# Patient Record
Sex: FEMALE | Race: OTHER | HISPANIC OR LATINO | Employment: FULL TIME | URBAN - METROPOLITAN AREA
[De-identification: names, ages, dates, MRNs, and addresses within clinical notes are randomized per-mention and may not be internally consistent; named-entity substitution may affect disease eponyms.]

---
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
## 2018-07-25 ENCOUNTER — TRANSCRIBE ORDERS (OUTPATIENT)
Dept: LAB | Facility: CLINIC | Age: 18
End: 2018-07-25

## 2018-07-25 ENCOUNTER — APPOINTMENT (OUTPATIENT)
Dept: LAB | Facility: CLINIC | Age: 18
End: 2018-07-25
Payer: COMMERCIAL

## 2018-07-25 DIAGNOSIS — Z13.0 SCREENING FOR IRON DEFICIENCY ANEMIA: Primary | ICD-10-CM

## 2018-07-25 DIAGNOSIS — Z13.0 SCREENING FOR IRON DEFICIENCY ANEMIA: ICD-10-CM

## 2018-07-25 DIAGNOSIS — Z13.9 SCREENING FOR CONDITION: ICD-10-CM

## 2018-07-25 LAB
ALBUMIN SERPL BCP-MCNC: 3.9 G/DL (ref 3.5–5)
ALP SERPL-CCNC: 41 U/L (ref 46–116)
ALT SERPL W P-5'-P-CCNC: 28 U/L (ref 12–78)
ANION GAP SERPL CALCULATED.3IONS-SCNC: 12 MMOL/L (ref 4–13)
AST SERPL W P-5'-P-CCNC: 17 U/L (ref 5–45)
BILIRUB SERPL-MCNC: 0.6 MG/DL (ref 0.2–1)
BUN SERPL-MCNC: 14 MG/DL (ref 5–25)
CALCIUM SERPL-MCNC: 9.3 MG/DL (ref 8.3–10.1)
CHLORIDE SERPL-SCNC: 101 MMOL/L (ref 100–108)
CO2 SERPL-SCNC: 25 MMOL/L (ref 21–32)
CREAT SERPL-MCNC: 0.93 MG/DL (ref 0.6–1.3)
ERYTHROCYTE [DISTWIDTH] IN BLOOD BY AUTOMATED COUNT: 13.2 % (ref 11.6–15.1)
FERRITIN SERPL-MCNC: 38 NG/ML (ref 8–388)
GFR SERPL CREATININE-BSD FRML MDRD: 89 ML/MIN/1.73SQ M
GLUCOSE SERPL-MCNC: 86 MG/DL (ref 65–140)
HCT VFR BLD AUTO: 43 % (ref 34.8–46.1)
HGB BLD-MCNC: 14.2 G/DL (ref 11.5–15.4)
MCH RBC QN AUTO: 28.2 PG (ref 26.8–34.3)
MCHC RBC AUTO-ENTMCNC: 33 G/DL (ref 31.4–37.4)
MCV RBC AUTO: 85 FL (ref 82–98)
PLATELET # BLD AUTO: 237 THOUSANDS/UL (ref 149–390)
PMV BLD AUTO: 10.6 FL (ref 8.9–12.7)
POTASSIUM SERPL-SCNC: 4.2 MMOL/L (ref 3.5–5.3)
PROT SERPL-MCNC: 8.1 G/DL (ref 6.4–8.2)
RBC # BLD AUTO: 5.04 MILLION/UL (ref 3.81–5.12)
SODIUM SERPL-SCNC: 138 MMOL/L (ref 136–145)
TSH SERPL DL<=0.05 MIU/L-ACNC: 1.54 UIU/ML (ref 0.46–3.98)
WBC # BLD AUTO: 11.61 THOUSAND/UL (ref 4.31–10.16)

## 2018-07-25 PROCEDURE — 80053 COMPREHEN METABOLIC PANEL: CPT

## 2018-07-25 PROCEDURE — 82728 ASSAY OF FERRITIN: CPT

## 2018-07-25 PROCEDURE — 36415 COLL VENOUS BLD VENIPUNCTURE: CPT

## 2018-07-25 PROCEDURE — 84443 ASSAY THYROID STIM HORMONE: CPT

## 2018-07-25 PROCEDURE — 85027 COMPLETE CBC AUTOMATED: CPT

## 2018-08-06 ENCOUNTER — OFFICE VISIT (OUTPATIENT)
Dept: OBGYN CLINIC | Facility: OTHER | Age: 18
End: 2018-08-06
Payer: COMMERCIAL

## 2018-08-06 VITALS
SYSTOLIC BLOOD PRESSURE: 128 MMHG | WEIGHT: 170.4 LBS | BODY MASS INDEX: 28.39 KG/M2 | HEART RATE: 78 BPM | DIASTOLIC BLOOD PRESSURE: 87 MMHG | HEIGHT: 65 IN

## 2018-08-06 DIAGNOSIS — Z87.81 HISTORY OF FRACTURE OF LEFT ANKLE: Primary | ICD-10-CM

## 2018-08-06 PROCEDURE — 99213 OFFICE O/P EST LOW 20 MIN: CPT | Performed by: STUDENT IN AN ORGANIZED HEALTH CARE EDUCATION/TRAINING PROGRAM

## 2018-08-06 NOTE — PROGRESS NOTES
Assessment/Plan:    Diagnoses and all orders for this visit:    History of fracture of left ankle      Mamie had a normal left ankle exam at today's visit which would not limit her from enrolling in the [facility], as far as the left ankle is concerned  I did provide a note that stated the above  She can return to the office on a PRN basis if needed  She acknowledged understanding and agreement with the plan  Subjective:     HPI    Jos Barrett is a 19year old female that comes to the office due to concerns of left ankle pain  She has history of left ankle surgery in 2013 and states that she needs clearance of that ankle because she would like to join the [facility]  The surgery was performed by Dr Stephanie Barry (sp?) in 2013 and reports no issues since the operation  She is able to ambulate without difficulty or deficits  She reports no deficits of the left ankle  Denies any pain at the current time or any numbness, tingling and weakness  Reports full ROM  Review of Systems   Constitutional: Negative for chills and fever  HENT: Negative for congestion, rhinorrhea and sore throat  Eyes: Negative for visual disturbance  Respiratory: Negative for shortness of breath  Cardiovascular: Negative for chest pain  Gastrointestinal: Negative for abdominal pain  Musculoskeletal: Positive for arthralgias (left ankle pain)  Skin: Negative for rash  Objective:    Vitals:    08/06/18 1045   BP: 128/87   Pulse: 78       Physical Exam   Constitutional: She is oriented to person, place, and time  She appears well-developed and well-nourished  No distress  HENT:   Head: Normocephalic and atraumatic  Right Ear: External ear normal    Left Ear: External ear normal    Nose: Nose normal    Eyes: EOM are normal  No scleral icterus  Neck: Neck supple  Pulmonary/Chest: Effort normal  No respiratory distress  Abdominal: Soft  Neurological: She is alert and oriented to person, place, and time     Skin: Skin is warm  She is not diaphoretic  Psychiatric: She has a normal mood and affect  Left Ankle Exam     Range of Motion   Normal left ankle ROM    Muscle Strength   Normal left ankle strength    Comments:   Well healed linear incision on falguni-lateral aspect of ankle, approximately 4-5 cm in diameter  Normal single stance bilaterally  Normal tip-toe and heel walk  Normal single leg jump  Normal squatting

## 2018-08-21 ENCOUNTER — TRANSCRIBE ORDERS (OUTPATIENT)
Dept: SLEEP CENTER | Facility: CLINIC | Age: 18
End: 2018-08-21

## 2018-08-21 DIAGNOSIS — G47.33 OSA (OBSTRUCTIVE SLEEP APNEA): Primary | ICD-10-CM

## 2018-08-23 ENCOUNTER — HOSPITAL ENCOUNTER (OUTPATIENT)
Dept: SLEEP CENTER | Facility: CLINIC | Age: 18
Discharge: HOME | End: 2018-08-23
Payer: COMMERCIAL

## 2018-08-23 DIAGNOSIS — G47.33 OSA (OBSTRUCTIVE SLEEP APNEA): ICD-10-CM

## 2018-08-23 PROCEDURE — 95810 POLYSOM 6/> YRS 4/> PARAM: CPT

## 2018-08-24 NOTE — PROGRESS NOTES
Sleep Study Documentation    Pre-Sleep Study       Sleep testing procedure explained to patient:YES    Patient napped prior to study:NO    Caffeine:Dayshift worker after 12PM   Caffeine use:NO    Alcohol:Dayshift workers after 5PM: Alcohol use:NO    Typical day for patient:YES       Study Documentation  Diagnostic   Snore:Mild  Supplemental O2: no    O2 flow rate (L/min) range   O2 flow rate (L/min) final   Minimum SaO2 87%  Baseline SaO2 96%    EKG abnormalities: yes:  EPOCH example and comments: Tachycardia    EEG abnormalities: no    Study Terminated:no    Patient classification: employed       Post-Sleep Study    Medication used at bedtime or during sleep study:NO    Patient reports time it took to fall asleep:less than 20 minutes    Patient reports waking up during study:1 to 2 times  Patient reports returning to sleep without difficulty  Patient reports sleeping 4 to 6 hours without dreaming  Patient reports sleep during study:worse than usual    Patient rated sleepiness: Somewhat sleepy or tired    PAP treatment:no

## 2018-08-28 ENCOUNTER — TELEPHONE (OUTPATIENT)
Dept: SLEEP CENTER | Facility: CLINIC | Age: 18
End: 2018-08-28

## 2018-08-28 NOTE — TELEPHONE ENCOUNTER
Spoke with patient regarding no obstructive sleep apnea on study  She needs consult ASAP due to [employer]  She will also need copy of sleep study from 4 years ago    Scheduled consult with Dr Marie Camara on 9/5 @ 4

## 2018-09-05 ENCOUNTER — OFFICE VISIT (OUTPATIENT)
Dept: SLEEP CENTER | Facility: CLINIC | Age: 18
End: 2018-09-05
Payer: COMMERCIAL

## 2018-09-05 VITALS
WEIGHT: 174 LBS | HEART RATE: 82 BPM | SYSTOLIC BLOOD PRESSURE: 116 MMHG | HEIGHT: 66 IN | BODY MASS INDEX: 27.97 KG/M2 | RESPIRATION RATE: 14 BRPM | DIASTOLIC BLOOD PRESSURE: 62 MMHG

## 2018-09-05 DIAGNOSIS — Z13.9 SCREENING FOR CONDITION: ICD-10-CM

## 2018-09-05 DIAGNOSIS — Z13.0 SCREENING FOR IRON DEFICIENCY ANEMIA: ICD-10-CM

## 2018-09-05 PROCEDURE — 99204 OFFICE O/P NEW MOD 45 MIN: CPT | Performed by: INTERNAL MEDICINE

## 2018-09-05 RX ORDER — ACYCLOVIR 400 MG/1
1 TABLET ORAL 3 TIMES DAILY
COMMUNITY
Start: 2013-02-05 | End: 2018-09-05 | Stop reason: ALTCHOICE

## 2018-09-05 RX ORDER — ALBUTEROL SULFATE 2.5 MG/3ML
1 SOLUTION RESPIRATORY (INHALATION)
COMMUNITY
Start: 2012-11-26 | End: 2018-09-05 | Stop reason: ALTCHOICE

## 2018-09-05 NOTE — PROGRESS NOTES
Consultation - [address] [place] [address] : 2000  MRN: [id number]      Assessment:  The patient has no clinical or polysomnographic evidence of obstructive sleep apnea  Plan:  No further evaluation is necessary  I composed the letter for the patient stating that there was no restriction on her activity from a sleep standpoint  Follow up:  As needed    History of Present Illness:   19 y  o female who was suspected of having obstructive sleep apnea in   Polysomnography at that time showed no apneas or hypopneas  Since that time she continues to snore, but denies any daytime sleepiness  She denies any history of hypertension or other cardiovascular disease  Review of Systems:      Genitourinary none   Cardiology none   Gastrointestinal none   Neurology none   Constitutional none   Integumentary none   Psychiatry none   Musculoskeletal none   Pulmonary none   ENT none   Endocrine none   Hematological none           Historical Information    Past Medical History:  None      Family History: non-contributory      Sleep Schedule: unremarkable    Snoring:    Yes      Witnessed Apnea:    No    Medications/Allergies:  No current outpatient prescriptions on file  No notes on file                  Objective:    Vital Signs:   Vitals:    18 1600   BP: 116/62   BP Location: Left arm   Cuff Size: Large   Pulse: 82   Resp: 14   Weight: 78 9 kg (174 lb)   Height: 5' 6" (1 676 m)            [place] Sleepiness Scale:  Total score: 5    Physical Exam:    General: Alert, appropriate, cooperative, normal build    Head: NC/AT, moderate retrognathia    Nose: No septal deviation, nares not obstructed, mucosa normal    Throat: Airway lumen patent, tongue base mildly thickened, no tonsils visualized    Neck: Normal, no thyromegaly or lymphadenopathy, no JVD    Heart: RR, normal S1 and S2, no murmurs    Chest: Clear bilaterally    Extremity: No clubbing, cyanosis, no edema    Skin: Warm, dry    Neuro: No motor abnormalities, cranial nerves appear intact    Sleep Study Results:   No sleep apnea      Counseling / Coordination of Care  Total clinic time spent today 25 minutes  Greater than 50% of total time was spent with the patient and / or family counseling and / or coordination of care  A description of the counseling / coordination of care: We discussed the pathophysiology of obstructive sleep apnea as well as the possible treatment options  We also discussed the rationale for positive airway pressure therapy  JOBY Ba    Board Certified Sleep Specialist

## 2018-11-01 ENCOUNTER — HOSPITAL ENCOUNTER (EMERGENCY)
Facility: HOSPITAL | Age: 18
Discharge: HOME | End: 2018-11-01
Attending: EMERGENCY MEDICINE | Admitting: EMERGENCY MEDICINE
Payer: COMMERCIAL

## 2018-11-01 ENCOUNTER — APPOINTMENT (EMERGENCY)
Dept: RADIOLOGY | Facility: HOSPITAL | Age: 18
End: 2018-11-01
Payer: COMMERCIAL

## 2018-11-01 VITALS
RESPIRATION RATE: 16 BRPM | SYSTOLIC BLOOD PRESSURE: 123 MMHG | HEART RATE: 90 BPM | BODY MASS INDEX: 29.05 KG/M2 | OXYGEN SATURATION: 98 % | TEMPERATURE: 98.5 F | DIASTOLIC BLOOD PRESSURE: 57 MMHG | WEIGHT: 180 LBS

## 2018-11-01 DIAGNOSIS — S93.402A LEFT ANKLE SPRAIN: Primary | ICD-10-CM

## 2018-11-01 PROCEDURE — 73610 X-RAY EXAM OF ANKLE: CPT

## 2018-11-01 PROCEDURE — 73630 X-RAY EXAM OF FOOT: CPT

## 2018-11-01 PROCEDURE — 99283 EMERGENCY DEPT VISIT LOW MDM: CPT

## 2018-11-01 RX ORDER — IBUPROFEN 600 MG/1
600 TABLET ORAL ONCE
Status: COMPLETED | OUTPATIENT
Start: 2018-11-01 | End: 2018-11-01

## 2018-11-01 RX ADMIN — IBUPROFEN 600 MG: 600 TABLET ORAL at 15:37

## 2018-11-01 NOTE — DISCHARGE INSTRUCTIONS
Ibuprofen 600mg by mouth every 6 hours as needed for mild to moderate pain or fever  Acetaminophen 650mg by mouth every 8 hours as needed for mild to moderate pain or fever  You can take Ibuprofen and Acetaminophen together safely  Apply a compressive ACE bandage  Rest and elevate the affected painful area  Apply cold compresses intermittently as needed  As pain recedes, begin normal activities slowly as tolerated  Follow up with primary care doctor in 3-5 days for further evaluation  You can also follow up with an orthopedist for further evaluation  Return to the ED for fevers, chills, redness, warmth, numbness, tingling, weakness or any other concerns  Ankle Sprain   WHAT YOU NEED TO KNOW:   An ankle sprain happens when 1 or more ligaments in your ankle joint stretch or tear  Ligaments are tough tissues that connect bones  Ligaments support your joints and keep your bones in place  DISCHARGE INSTRUCTIONS:   Return to the emergency department if:   · You have severe pain in your ankle  · Your foot or toes are cold or numb  · Your ankle becomes more weak or unstable (wobbly)  · You are unable to put any weight on your ankle or foot  · Your swelling has increased or returned  Contact your healthcare provider if:   · Your pain does not go away, even after treatment  · You have questions or concerns about your condition or care  Medicines: You may need any of the following:  · NSAIDs , such as ibuprofen, help decrease swelling, pain, and fever  This medicine is available with or without a doctor's order  NSAIDs can cause stomach bleeding or kidney problems in certain people  If you take blood thinner medicine, always ask your healthcare provider if NSAIDs are safe for you  Always read the medicine label and follow directions  · Acetaminophen  decreases pain  It is available without a doctor's order  Ask how much to take and how often to take it  Follow directions  Acetaminophen can cause liver damage if not taken correctly  · Prescription pain medicine  may be given  Ask how to take this medicine safely  · Take your medicine as directed  Contact your healthcare provider if you think your medicine is not helping or if you have side effects  Tell him or her if you are allergic to any medicine  Keep a list of the medicines, vitamins, and herbs you take  Include the amounts, and when and why you take them  Bring the list or the pill bottles to follow-up visits  Carry your medicine list with you in case of an emergency  Self care:   · Use support devices,  such as a brace, cast, or splint, may be needed to limit your movement and protect your joint  You may need to use crutches to decrease your pain as you move around  · Go to physical therapy as directed  A physical therapist teaches you exercises to help improve movement and strength, and to decrease pain  · Rest  your ankle so that it can heal  Return to normal activities as directed  · Apply ice on your ankle for 15 to 20 minutes every hour or as directed  Use an ice pack, or put crushed ice in a plastic bag  Cover it with a towel  Ice helps prevent tissue damage and decreases swelling and pain  · Compress  your ankle  Ask if you should wrap an elastic bandage around your injured ligament  An elastic bandage provides support and helps decrease swelling and movement so your joint can heal  Wear as long as directed  · Elevate  your ankle above the level of your heart as often as you can  This will help decrease swelling and pain  Prop your ankle on pillows or blankets to keep it elevated comfortably  Prevent another ankle sprain:   · Let your ankle heal   Find out how long your ligament needs to heal  Do not do any physical activity until your healthcare provider says it is okay  If you start activity too soon, you may develop a more serious injury      · Always warm up and stretch  before you exercise or play sports  · Use the right equipment  Always wear shoes that fit well and are made for the activity that you are doing  You may also need ankle supports, elbow and knee pads, or braces  Follow up with your healthcare provider as directed:  Write down your questions so you remember to ask them during your visits  © 2017 [employer]  Information is for End User's use only and may not be sold, redistributed or otherwise used for commercial purposes  All illustrations and images included in CareNotes® are the copyrighted property of A D A M , Inc  or [employer]  The above information is an  only  It is not intended as medical advice for individual conditions or treatments  Talk to your doctor, nurse or pharmacist before following any medical regimen to see if it is safe and effective for you

## 2018-11-01 NOTE — ED PROVIDER NOTES
History  Chief Complaint   Patient presents with    Ankle Injury     Pt reports "rolling" left ankle last night  20-year-old female with past medical history of left ankle fracture, who presents to emergency department for left ankle and foot pain and swelling status post inversion injury while he was standing yesterday  Patient describes the pain as 8/10 pain that is aching, constant  States that the pain is sharp and worse with ambulation and with movement of the ankle  Denies numbness, tingling, weakness  Denies redness or warmth  Denies fevers or chills  Did not take any pain medication prior to arrival in the emergency department  She is able to ambulate, but with difficulty  History provided by:  Patient   used: No        None       History reviewed  No pertinent past medical history  Past Surgical History:   Procedure Laterality Date    ANKLE SURGERY Left        History reviewed  No pertinent family history  I have reviewed and agree with the history as documented  Social History   Substance Use Topics    Smoking status: Never Smoker    Smokeless tobacco: Never Used    Alcohol use No        Review of Systems   Constitutional: Negative for chills and fever  HENT: Negative  Eyes: Negative  Respiratory: Negative for cough, chest tightness, shortness of breath and wheezing  Cardiovascular: Negative for chest pain, palpitations and leg swelling  Gastrointestinal: Negative for abdominal pain, constipation, diarrhea, nausea and vomiting  Genitourinary: Negative for dysuria, flank pain, frequency, hematuria and urgency  Musculoskeletal: Positive for arthralgias, gait problem and joint swelling  Negative for back pain, myalgias, neck pain and neck stiffness  Skin: Negative for color change, pallor, rash and wound  Neurological: Negative for dizziness, syncope, weakness, light-headedness, numbness and headaches  Psychiatric/Behavioral: Negative  Physical Exam  Physical Exam   Constitutional: She is oriented to person, place, and time  She appears well-developed and well-nourished  No distress  HENT:   Head: Normocephalic and atraumatic  Eyes: Pupils are equal, round, and reactive to light  Conjunctivae and EOM are normal    Neck: Normal range of motion  Neck supple  Cardiovascular: Normal rate, regular rhythm and intact distal pulses  Pulmonary/Chest: Effort normal  No respiratory distress  Abdominal: Soft  There is no tenderness  Musculoskeletal: Normal range of motion  She exhibits edema (left medial and lateral malleolus) and tenderness (mild TTP to the left medial and lateral malleolus and proximal midfoot  )  Neurological: She is alert and oriented to person, place, and time  No cranial nerve deficit or sensory deficit  She exhibits normal muscle tone  Coordination normal    Skin: Skin is warm and dry  Capillary refill takes less than 2 seconds  She is not diaphoretic  Psychiatric: She has a normal mood and affect  Her behavior is normal    Nursing note and vitals reviewed  Vital Signs  ED Triage Vitals [11/01/18 1500]   Temperature Pulse Respirations Blood Pressure SpO2   98 5 °F (36 9 °C) 90 16 123/57 98 %      Temp Source Heart Rate Source Patient Position - Orthostatic VS BP Location FiO2 (%)   Oral -- -- -- --      Pain Score       8           Vitals:    11/01/18 1500   BP: 123/57   Pulse: 90       Visual Acuity      ED Medications  Medications   ibuprofen (MOTRIN) tablet 600 mg (600 mg Oral Given 11/1/18 1537)       Diagnostic Studies  Results Reviewed     None                 XR foot 3+ views LEFT   ED Interpretation by Remy Lipscomb PA-C (11/01 6771)   Acute vs chronic fracture of the navicular      Final Result by Remi Zhao MD (11/01 1552)      No acute osseous abnormality              Workstation performed: [id number]         XR ankle 3+ views LEFT   ED Interpretation by Remy Lipscomb PA-C (11/01 1537)   NO acute osseous abnormality  Final Result by Blue Erickson MD (11/01 1550)      No acute osseous abnormality  Workstation performed: [id number]                    Procedures  Procedures       Phone Contacts  ED Phone Contact    ED Course  ED Course as of Nov 01 1600   Thu Nov 01, 2018   1541 Due to possible new vs old fracture of the navicular bone, asked radiology to read the XR  MDM  Number of Diagnoses or Management Options  Left ankle sprain:   Diagnosis management comments: Differential Diagnosis includes but is not limited to: sprain/strain, contusion, fracture/dislocation, musculoskeletal pain  XR shows no acute osseous abnormality  Likely sprain  Given IBU in the ED  ACE wrap applied  Dc home with PMD and podiatry follow up prn  Amount and/or Complexity of Data Reviewed  Tests in the radiology section of CPT®: ordered and reviewed  Independent visualization of images, tracings, or specimens: yes      CritCare Time    Disposition  Final diagnoses:   Left ankle sprain     Time reflects when diagnosis was documented in both MDM as applicable and the Disposition within this note     Time User Action Codes Description Comment    11/1/2018  3:55 PM Nain Ruiz Add [[id number]] Left ankle sprain       ED Disposition     ED Disposition Condition Comment    Discharge  [address] [place] discharge to home/self care  Condition at discharge: Good        Follow-up Information     Follow up With Specialties Details Why Contact Info    Judge Colton MD Family Medicine In 1 week As needed, If symptoms worsen [address]  [address] [place] [address]      [address] [facility] In 2 weeks As needed, If symptoms worsen [address] [place] [address]  [phone]            Patient's Medications    No medications on file     No discharge procedures on file      ED Provider  Electronically Signed by           Natasha Poole PA-C  11/01/18 [address] [facility]

## 2018-12-07 ENCOUNTER — OFFICE VISIT (OUTPATIENT)
Dept: OBGYN CLINIC | Facility: HOSPITAL | Age: 18
End: 2018-12-07
Payer: COMMERCIAL

## 2018-12-07 VITALS
SYSTOLIC BLOOD PRESSURE: 100 MMHG | BODY MASS INDEX: 29.99 KG/M2 | WEIGHT: 180 LBS | DIASTOLIC BLOOD PRESSURE: 66 MMHG | HEIGHT: 65 IN | HEART RATE: 81 BPM

## 2018-12-07 DIAGNOSIS — L02.32 BOIL OF BUTTOCK: Primary | ICD-10-CM

## 2018-12-07 DIAGNOSIS — Z11.3 ROUTINE SCREENING FOR STI (SEXUALLY TRANSMITTED INFECTION): ICD-10-CM

## 2018-12-07 DIAGNOSIS — Z30.09 ENCOUNTER FOR OTHER GENERAL COUNSELING OR ADVICE ON CONTRACEPTION: ICD-10-CM

## 2018-12-07 PROBLEM — Z30.9 CONTRACEPTIVE MANAGEMENT: Status: ACTIVE | Noted: 2018-12-07

## 2018-12-07 PROCEDURE — 87491 CHLMYD TRACH DNA AMP PROBE: CPT | Performed by: NURSE PRACTITIONER

## 2018-12-07 PROCEDURE — 99213 OFFICE O/P EST LOW 20 MIN: CPT | Performed by: NURSE PRACTITIONER

## 2018-12-07 PROCEDURE — 87255 GENET VIRUS ISOLATE HSV: CPT | Performed by: NURSE PRACTITIONER

## 2018-12-07 PROCEDURE — 87591 N.GONORRHOEAE DNA AMP PROB: CPT | Performed by: NURSE PRACTITIONER

## 2018-12-07 NOTE — PROGRESS NOTES
Assessment/Plan:      Diagnoses and all orders for this visit:    Boil of buttock  -     Herpes simplex virus culture    Encounter for other general counseling or advice on contraception    Routine screening for STI (sexually transmitted infection)  -     Chlamydia/GC amplified DNA by PCR      -all birth control options including LARCs reviewed  Patient is interested in having Mirena IUD placed  -reviewed with patient Mirena IUD is effective for 5 years  Can expect abnormal uterine bleeding varying from spotting to later shorter periods were amenorrhea  Reviewed placement and removal procedure  Patient encouraged to come with next menstrual cycle and to take ibuprofen prior to placement  Reviewed with patient all side effects will be treated medically prior to removal   -hygiene reviewed patient encouraged to stop shaving  Exfoliate, use antibacterial soap in areas prone to boils groin and under arms  Reviewed to avoid vagina  Encouraged patient to wear cotton underwear  Avoid tight/restrictive clothing  -will call with abnormal results    RTO for Mirena IUD placement    Subjective:     Patient ID: Madison Harada is a 19 y o  female  HPI here with complaints of lump  Patient states she has multiple ingrown hairs, acne and "cyst like balls"that occur in her groin, axilla region and between buttocks  Most recent lump is right side of buttock  Has been present for approximately 1 month  In the last 2 days feels like it is getting larger and more painful  Denies drainage  Would like to discuss contraception options  Menstrual cycles every 28-35 day lasting 4- 8 days  Is sexually active  Is not always consistent condom use  Review of Systems   Constitutional: Negative for chills and fever  Respiratory: Negative  Cardiovascular: Negative  Gastrointestinal: Negative  Genitourinary: Negative  Skin:        Lump, painful right buttock   Neurological: Negative for headaches  Objective:     Physical Exam   Constitutional: She is oriented to person, place, and time  She appears well-developed and well-nourished  Cardiovascular: Normal rate, regular rhythm and normal heart sounds  Pulmonary/Chest: Effort normal and breath sounds normal    Genitourinary:         Genitourinary Comments: Area 2 x2, draining white, serous fluid  Minimal erythema  minimally tender to touch  Neurological: She is alert and oriented to person, place, and time  Skin: Skin is warm and dry  Psychiatric: She has a normal mood and affect   Her behavior is normal

## 2018-12-07 NOTE — PATIENT INSTRUCTIONS
Levonorgestrel (Into the uterus)   Levonorgestrel (wco-gko-nge-ORIN-trel)  Prevents pregnancy and treats heavy menstrual bleeding  This is an intrauterine device (IUD), which is a reversible form of birth control  This IUD slowly releases levonorgestrel, a hormone  Brand Name(s): Itzel Platts, Mirena, [place]   There may be other brand names for this medicine  When This Medicine Should Not Be Used: This device is not right for everyone  Do not use it if you had an allergic reaction to levonorgestrel, or you are pregnant  How to Use This Medicine:   Device  · The IUD is usually inserted by your doctor during your monthly period  You will need to see your doctor 4 to 6 weeks after the IUD is placed and then once a year  · Your IUD has a string or "tail" that is made of plastic thread  About one or two inches of this string hangs into your vagina  You cannot see this string, and it will not cause problems when you have sex  Check your IUD after each monthly period  You may not be protected against pregnancy if you cannot feel the string or if you feel plastic  Do the following to check the placement of your IUD:  [facility] [place] [facility] your hands with soap and warm water  Dry them with a clean towel  ¨ Bend your knees and squat low to the ground  ¨ Gently put your index finger high inside your vagina  The cervix is at the top of the vagina  Find the IUD string coming from your cervix  Never pull on the string  You should not be able to feel the plastic of the IUD itself  Wash your hands after you are done checking your IUD string  · Your doctor will need to replace your IUD after 3 years for [facility] or [place], or after 5 years for [facility] or [place] [id number]  You will also need to have it replaced if it comes out of your uterus  Drugs and Foods to Avoid:   Ask your doctor or pharmacist before using any other medicine, including over-the-counter medicines, vitamins, and herbal products    · Some medicines can affect how this device works  Tell your doctor if you are using a blood thinner (including warfarin)  Warnings While Using This Medicine:   · Tell your doctor if you are breastfeeding, or you have had a baby, miscarriage, or  in the past 3 months  Tell your doctor if you have liver disease (including tumor or cancer), breast cancer, heart or blood circulation problems, including a history of heart valve problems, heart disease, blood clotting problems, stroke, heart attack, or high blood pressure  Tell your doctor if you have problems with your immune system or have had surgery on your female organs (especially fallopian tubes)  · Tell your doctor if you have had any problems, infections, or other conditions that affected your reproductive system  There are many problems that could make an IUD a bad choice for you, including if you have fibroids, unexplained bleeding, a uterus that has an unusual shape, a recent infection, pelvic inflammatory disease, abnormal Pap test, ectopic pregnancy, cancer or suspected cancer, or an existing IUD  · There is a small chance that you could get pregnant when using an IUD, just as there is with any birth control  If you get pregnant, your doctor may remove your IUD to lower the risk of miscarriage or other problems  · This medicine may cause the following problems:  ¨ Increased risk of ectopic pregnancy (pregnancy outside the uterus)  ¨ Increased risk of a serious infection called pelvic inflammatory disease (PID)  ¨ Increased risk for ovarian cysts  ¨ Perforation (hole in the wall of your uterus), which can damage other organs  · You might have some spotting and cramping during the first weeks after the IUD has been inserted  These symptoms should decrease or go away within a few weeks up to 6 months  · You could have less bleeding or even stop having periods by the end of the first year   Call your doctor if you have a change from the regular bleeding pattern after you have had your IUD for awhile, such as more bleeding or if you miss a period (and you were having periods even with your IUD)  · An IUD can slip partly or all of the way out of your uterus  If this happens, use condoms or another form of birth control, and call your doctor right away  · This IUD will not protect you from HIV/AIDS, herpes, or other sexually transmitted diseases  · If you have the Elana Coma or [place] July, tell your healthcare provider before you have an MRI test   Possible Side Effects While Using This Medicine:   Call your doctor right away if you notice any of these side effects:  · Allergic reaction: Itching or hives, swelling in your face or hands, swelling or tingling in your mouth or throat, chest tightness, trouble breathing  · Chest pain, problems with speech or walking, numbness or weakness in your arm or leg or on one side of your body  · Heavy bleeding from your vagina  · Pain during sex, or if your partner feels the hard plastic of the IUD during sex  · Severe headache, vision changes  · Stomach or pelvic pain, tenderness, or cramping that is sudden or severe  · Vaginal discharge has a bad smell, fever, chills, sores on your genitals  · Yellow skin or eyes  If you notice these less serious side effects, talk with your doctor:   · Acne or other skin changes  · Breast pain  · Change in bleeding pattern after the first few months  · Dizziness or lightheadedness after IUD is placed  · Mild itching around your vagina and genitals  If you notice other side effects that you think are caused by this medicine, tell your doctor  Call your doctor for medical advice about side effects  You may report side effects to FDA at 2-215-FDA-8847  © 2017 [address] [employer] Information is for End User's use only and may not be sold, redistributed or otherwise used for commercial purposes  The above information is an  only  It is not intended as medical advice for individual conditions or treatments  Talk to your doctor, nurse or pharmacist before following any medical regimen to see if it is safe and effective for you

## 2018-12-08 LAB
C TRACH DNA SPEC QL NAA+PROBE: NEGATIVE
N GONORRHOEA DNA SPEC QL NAA+PROBE: NEGATIVE

## 2018-12-11 LAB — HSV SPEC CULT: NORMAL

## 2018-12-20 PROCEDURE — 58300 INSERT INTRAUTERINE DEVICE: CPT | Performed by: OBSTETRICS & GYNECOLOGY

## 2018-12-20 NOTE — PROGRESS NOTES
Iud insertions  Date/Time: 12/20/2018 4:06 PM  Performed by: Mikhail Salmeron  Authorized by: Mikhail Salmeron     Consent:     Consent obtained:  Written and verbal    Procedure risks and benefits discussed: yes      Patient questions answered: yes      Patient agrees, verbalizes understanding, and wants to proceed: yes      Educational handouts given: yes      Instructions and paperwork completed: yes    Procedure:     Negative GC/chlamydia test: yes      Negative urine pregnancy test: yes      Cervix cleaned and prepped: yes      Speculum placed in vagina: yes      Tenaculum applied to cervix: yes      Uterus sounded: yes      IUD inserted with no complications: yes      IUD type:  Mirena    Strings trimmed: yes      Uterus sound depth (cm):  7  Post-procedure:     Patient tolerated procedure well: yes      Patient will follow up after next period: yes    Comments:      20 yo  G0 with LMP 12/19/18 here for Mirena placement  She was counseled on all contraceptive options on 12/07/2018 and elected to proceed with IUD placement  I again today reviewed all her options as they pertain to birth control and patient is confident that she would like to proceed with Mirena placement  She is aware that there is a chance of abnormal uterine bleeding with IUD, lighter periods, or amenorrhea  All questions were answered  Patient was instructed to use condoms if she has intercourse within the next 48-72 hours  She will return in 4-6 weeks for a string check

## 2018-12-21 ENCOUNTER — PROCEDURE VISIT (OUTPATIENT)
Dept: OBGYN CLINIC | Facility: HOSPITAL | Age: 18
End: 2018-12-21
Payer: COMMERCIAL

## 2018-12-21 VITALS
DIASTOLIC BLOOD PRESSURE: 63 MMHG | HEIGHT: 65 IN | SYSTOLIC BLOOD PRESSURE: 114 MMHG | BODY MASS INDEX: 30.66 KG/M2 | HEART RATE: 88 BPM | WEIGHT: 184 LBS

## 2018-12-21 DIAGNOSIS — Z97.5 CONTRACEPTION, DEVICE INTRAUTERINE: Primary | ICD-10-CM

## 2018-12-21 LAB — SL AMB POCT URINE HCG: NORMAL

## 2018-12-21 PROCEDURE — 58300 INSERT INTRAUTERINE DEVICE: CPT | Performed by: OBSTETRICS & GYNECOLOGY

## 2018-12-21 PROCEDURE — 81025 URINE PREGNANCY TEST: CPT | Performed by: OBSTETRICS & GYNECOLOGY

## 2018-12-21 NOTE — PATIENT INSTRUCTIONS
Please follow up in 4-6wks for a string check  Please call the office with any questions or concerns

## 2019-01-18 ENCOUNTER — TELEPHONE (OUTPATIENT)
Dept: OBGYN CLINIC | Facility: CLINIC | Age: 19
End: 2019-01-18

## 2019-06-27 ENCOUNTER — HOSPITAL ENCOUNTER (EMERGENCY)
Facility: HOSPITAL | Age: 19
Discharge: HOME | End: 2019-06-27
Attending: EMERGENCY MEDICINE | Admitting: EMERGENCY MEDICINE

## 2019-06-27 VITALS
DIASTOLIC BLOOD PRESSURE: 56 MMHG | SYSTOLIC BLOOD PRESSURE: 115 MMHG | OXYGEN SATURATION: 99 % | WEIGHT: 204.81 LBS | TEMPERATURE: 98.2 F | RESPIRATION RATE: 16 BRPM | HEART RATE: 81 BPM | BODY MASS INDEX: 34.08 KG/M2

## 2019-06-27 DIAGNOSIS — R42 DIZZINESS: ICD-10-CM

## 2019-06-27 DIAGNOSIS — F41.9 ANXIETY: ICD-10-CM

## 2019-06-27 DIAGNOSIS — R53.83 FATIGUE: Primary | ICD-10-CM

## 2019-06-27 LAB
ALBUMIN SERPL BCP-MCNC: 3.7 G/DL (ref 3.5–5)
ALP SERPL-CCNC: 50 U/L (ref 46–116)
ALT SERPL W P-5'-P-CCNC: 20 U/L (ref 12–78)
ANION GAP SERPL CALCULATED.3IONS-SCNC: 11 MMOL/L (ref 4–13)
AST SERPL W P-5'-P-CCNC: 20 U/L (ref 5–45)
BASOPHILS # BLD AUTO: 0.03 THOUSANDS/ΜL (ref 0–0.1)
BASOPHILS NFR BLD AUTO: 0 % (ref 0–1)
BILIRUB SERPL-MCNC: 0.5 MG/DL (ref 0.2–1)
BILIRUB UR QL STRIP: NEGATIVE
BILIRUB UR QL STRIP: NEGATIVE
BUN SERPL-MCNC: 13 MG/DL (ref 5–25)
CALCIUM SERPL-MCNC: 9 MG/DL (ref 8.3–10.1)
CHLORIDE SERPL-SCNC: 104 MMOL/L (ref 100–108)
CLARITY UR: CLEAR
CLARITY UR: CLEAR
CO2 SERPL-SCNC: 24 MMOL/L (ref 21–32)
COLOR UR: YELLOW
COLOR UR: YELLOW
CREAT SERPL-MCNC: 0.9 MG/DL (ref 0.6–1.3)
EOSINOPHIL # BLD AUTO: 0.05 THOUSAND/ΜL (ref 0–0.61)
EOSINOPHIL NFR BLD AUTO: 0 % (ref 0–6)
ERYTHROCYTE [DISTWIDTH] IN BLOOD BY AUTOMATED COUNT: 12.6 % (ref 11.6–15.1)
EXT PREG TEST URINE: NEGATIVE
EXT. CONTROL ED NAV: NORMAL
GFR SERPL CREATININE-BSD FRML MDRD: 92 ML/MIN/1.73SQ M
GLUCOSE SERPL-MCNC: 99 MG/DL (ref 65–140)
GLUCOSE UR STRIP-MCNC: NEGATIVE MG/DL
GLUCOSE UR STRIP-MCNC: NEGATIVE MG/DL
HCT VFR BLD AUTO: 41.7 % (ref 34.8–46.1)
HGB BLD-MCNC: 13.7 G/DL (ref 11.5–15.4)
HGB UR QL STRIP.AUTO: NEGATIVE
HGB UR QL STRIP.AUTO: NEGATIVE
IMM GRANULOCYTES # BLD AUTO: 0.03 THOUSAND/UL (ref 0–0.2)
IMM GRANULOCYTES NFR BLD AUTO: 0 % (ref 0–2)
KETONES UR STRIP-MCNC: ABNORMAL MG/DL
KETONES UR STRIP-MCNC: ABNORMAL MG/DL
LEUKOCYTE ESTERASE UR QL STRIP: NEGATIVE
LEUKOCYTE ESTERASE UR QL STRIP: NEGATIVE
LYMPHOCYTES # BLD AUTO: 2.42 THOUSANDS/ΜL (ref 0.6–4.47)
LYMPHOCYTES NFR BLD AUTO: 22 % (ref 14–44)
MCH RBC QN AUTO: 29.5 PG (ref 26.8–34.3)
MCHC RBC AUTO-ENTMCNC: 32.9 G/DL (ref 31.4–37.4)
MCV RBC AUTO: 90 FL (ref 82–98)
MONOCYTES # BLD AUTO: 0.61 THOUSAND/ΜL (ref 0.17–1.22)
MONOCYTES NFR BLD AUTO: 5 % (ref 4–12)
NEUTROPHILS # BLD AUTO: 8.11 THOUSANDS/ΜL (ref 1.85–7.62)
NEUTS SEG NFR BLD AUTO: 73 % (ref 43–75)
NITRITE UR QL STRIP: NEGATIVE
NITRITE UR QL STRIP: NEGATIVE
NRBC BLD AUTO-RTO: 0 /100 WBCS
PH UR STRIP.AUTO: 6 [PH]
PH UR STRIP.AUTO: 6.5 [PH] (ref 4.5–8)
PLATELET # BLD AUTO: 275 THOUSANDS/UL (ref 149–390)
PMV BLD AUTO: 10.1 FL (ref 8.9–12.7)
POTASSIUM SERPL-SCNC: 3.7 MMOL/L (ref 3.5–5.3)
PROT SERPL-MCNC: 7.6 G/DL (ref 6.4–8.2)
PROT UR STRIP-MCNC: NEGATIVE MG/DL
PROT UR STRIP-MCNC: NEGATIVE MG/DL
RBC # BLD AUTO: 4.65 MILLION/UL (ref 3.81–5.12)
SODIUM SERPL-SCNC: 139 MMOL/L (ref 136–145)
SP GR UR STRIP.AUTO: >=1.03 (ref 1–1.03)
SP GR UR STRIP.AUTO: >=1.03 (ref 1–1.03)
TSH SERPL DL<=0.05 MIU/L-ACNC: 1.48 UIU/ML (ref 0.46–3.98)
UROBILINOGEN UR QL STRIP.AUTO: 0.2 E.U./DL
UROBILINOGEN UR QL STRIP.AUTO: 0.2 E.U./DL
WBC # BLD AUTO: 11.25 THOUSAND/UL (ref 4.31–10.16)

## 2019-06-27 PROCEDURE — 81003 URINALYSIS AUTO W/O SCOPE: CPT | Performed by: PHYSICIAN ASSISTANT

## 2019-06-27 PROCEDURE — 36415 COLL VENOUS BLD VENIPUNCTURE: CPT | Performed by: PHYSICIAN ASSISTANT

## 2019-06-27 PROCEDURE — 81025 URINE PREGNANCY TEST: CPT | Performed by: PHYSICIAN ASSISTANT

## 2019-06-27 PROCEDURE — 99284 EMERGENCY DEPT VISIT MOD MDM: CPT | Performed by: PHYSICIAN ASSISTANT

## 2019-06-27 PROCEDURE — 85025 COMPLETE CBC W/AUTO DIFF WBC: CPT | Performed by: PHYSICIAN ASSISTANT

## 2019-06-27 PROCEDURE — 99285 EMERGENCY DEPT VISIT HI MDM: CPT

## 2019-06-27 PROCEDURE — 80053 COMPREHEN METABOLIC PANEL: CPT | Performed by: PHYSICIAN ASSISTANT

## 2019-06-27 PROCEDURE — 81003 URINALYSIS AUTO W/O SCOPE: CPT

## 2019-06-27 PROCEDURE — 84443 ASSAY THYROID STIM HORMONE: CPT | Performed by: PHYSICIAN ASSISTANT

## 2019-06-27 PROCEDURE — 96360 HYDRATION IV INFUSION INIT: CPT

## 2019-06-27 RX ORDER — MECLIZINE HYDROCHLORIDE 25 MG/1
25 TABLET ORAL 3 TIMES DAILY PRN
Qty: 10 TABLET | Refills: 0 | Status: SHIPPED | OUTPATIENT
Start: 2019-06-27

## 2019-06-27 RX ADMIN — SODIUM CHLORIDE 1000 ML: 0.9 INJECTION, SOLUTION INTRAVENOUS at 16:06

## 2019-06-27 NOTE — DISCHARGE INSTRUCTIONS
Fatigue   WHAT YOU NEED TO KNOW:   Fatigue is mental and physical exhaustion that does not get better with rest  Fatigue may make daily activities difficult or cause extreme sleepiness  It is normal to feel tired sometimes, but long-term fatigue may be a sign of serious illness  DISCHARGE INSTRUCTIONS:   Return to the emergency department if:   · You have chest pain  · You have difficulty breathing  Contact your healthcare provider if:   · You have a cough that gets worse, or does not go away  · You see blood in your urine or bowel movement  · You have numbness or tingling around your mouth or in an arm or leg  · You faint, feel dizzy, or have vision changes  · You have swelling in your lymph nodes  · You are a woman and have vaginal bleeding that is not normal for you, or is not expected  · You lose weight without trying, or you have trouble eating  · You feel weak or have muscle pain  · You have pain or swelling in your joints  · You have questions or concerns about your condition or care  Follow up with your healthcare provider as directed: You may need more tests  Your healthcare provider may also refer you to a specialist  Write down your questions so you remember to ask them during your visits  Manage fatigue:   · Keep a fatigue diary  Include anything that makes you feel more tired or less tired  Bring the diary with you to follow-up visits with your provider  · Exercise as directed  Exercise can help you feel more alert  Exercise can also help you manage stress or relieve depression  Try to get at least 30 minutes of exercise most days of the week  · Keep a regular sleep schedule  Go to bed and wake up at the same times every day  Limit naps to 1 hour each day  A nap can improve fatigue, but a long nap may make it harder to go to sleep at night  · Plan and limit your activities    Limit the number of activities such as shopping and cleaning you do each day  If possible, try to spread out your trips throughout the week  Plan ahead so you are not rushing to get something done  Only do activities that you have the energy to complete  Take breaks between activities  Ask for help if you need it  Another person may be able to drive you or help with daily activities  · Eat a variety of healthy foods  Healthy foods include fruits, vegetables, whole-grain breads, low-fat dairy products, beans, lean meats, and fish  Good nutrition can help manage fatigue  · Limit caffeine and alcohol  These can make it difficult to fall or stay asleep  Women should limit alcohol to 1 drink a day  Men should limit alcohol to 2 drinks a day  A drink of alcohol is 12 ounces of beer, 5 ounces of wine, or 1½ ounces of liquor  Ask our healthcare provider how much caffeine is safe for you  · Do not smoke  Nicotine and other chemicals in cigarettes and cigars can cause lung damage and increase fatigue  Ask your healthcare provider for information if you currently smoke and need help to quit  E-cigarettes or smokeless tobacco still contain nicotine  Talk to your healthcare provider before you use these products  © 2017 [facility] [facility] Information is for End User's use only and may not be sold, redistributed or otherwise used for commercial purposes  All illustrations and images included in CareNotes® are the copyrighted property of [employer]  or [employer]  The above information is an  only  It is not intended as medical advice for individual conditions or treatments  Talk to your doctor, nurse or pharmacist before following any medical regimen to see if it is safe and effective for you  Dizziness   WHAT YOU NEED TO KNOW:   Dizziness is a feeling of being off balance or unsteady  Common causes of dizziness are an inner ear fluid imbalance or a lack of oxygen in your blood   Dizziness may be acute (lasts 3 days or less) or chronic (lasts longer than 3 days)  You may have dizzy spells that last from seconds to a few hours  DISCHARGE INSTRUCTIONS:   Return to the emergency department if:   · You have a headache and a stiff neck  · You have shaking chills and a fever  · You vomit over and over with no relief  · Your vomit or bowel movements are red or black  · You have pain in your chest, back, or abdomen  · You have numbness, especially in your face, arms, or legs  · You have trouble moving your arms or legs  · You are confused  Contact your healthcare provider if:   · You have a fever  · Your symptoms do not get better with treatment  · You have questions or concerns about your condition or care  Manage your symptoms:   · Do not drive  or operate heavy machinery when you are dizzy  · Get up slowly  from sitting or lying down  · Drink plenty of liquids  Liquids help prevent dehydration  Ask how much liquid to drink each day and which liquids are best for you  Follow up with your healthcare provider as directed:  Write down your questions so you remember to ask them during your visits  © 2017 [address] [employer]  Information is for End User's use only and may not be sold, redistributed or otherwise used for commercial purposes  All illustrations and images included in CareNotes® are the copyrighted property of [employer]  or [employer] Krishna  The above information is an  only  It is not intended as medical advice for individual conditions or treatments  Talk to your doctor, nurse or pharmacist before following any medical regimen to see if it is safe and effective for you

## 2019-06-27 NOTE — ED PROVIDER NOTES
History  Chief Complaint   Patient presents with    Weakness - Generalized     PT reports "I think my iron levels are dropping, I have headaches, always cold and weak"     Arie Cedeno is a 19 y o  Female with a PMHx of Anemia who presents to the ED with complaints of feeling tired, feeling cold and increased anxiety over the past 3 days  Patient states she has intermittent dizziness throughout day which she describes as the room spinning and her eyes moving without being able to "catch up " Patient reports she was previously on anxiety medications does not like to take medication at this time  Patient states she has a history of anemia and was previously placed on iron supplementation which she currently does not take  Patient's recalls previous history of heavy menstrual periods which were alleviated after IUD placement in December  Patient reports last menstrual period lasted 10 days and consisted of mild spotting  Patient states she has a decreased appetite and has been eating small meals but feeling full easily  Patient states she has not been drinking adequate water throughout the day  Denies chest pain, shortness breath, abdominal pain, nausea, vomiting, diarrhea, vaginal bleeding, vaginal discharge, weakness, lethargy, syncope, headache, neck pain, neck stiffness, fever, chills        History provided by:  Patient  Fatigue   Severity:  Moderate  Duration:  3 days  Associated symptoms: dizziness    Associated symptoms: no abdominal pain, no anorexia, no aphasia, no arthralgias, no ataxia, no chest pain, no cough, no diarrhea, no difficulty walking, no drooling, no dysphagia, no dysuria, no numbness in extremities, no falls, no fever, no foul-smelling urine, no frequency, no headaches, no hematochezia, no lethargy, no loss of consciousness, no melena, no myalgias, no nausea, no near-syncope, no seizures, no sensory-motor deficit, no shortness of breath, no stroke symptoms, no syncope, no urgency, no vision change and no vomiting        None       Past Medical History:   Diagnosis Date    Anemia     Anxiety     Depression        Past Surgical History:   Procedure Laterality Date    ANKLE SURGERY Left        History reviewed  No pertinent family history  I have reviewed and agree with the history as documented  Social History     Tobacco Use    Smoking status: Never Smoker    Smokeless tobacco: Never Used   Substance Use Topics    Alcohol use: No    Drug use: Yes     Types: Marijuana        Review of Systems   Constitutional: Positive for fatigue  Negative for appetite change, chills, fever and unexpected weight change  HENT: Negative for congestion, drooling, ear pain, rhinorrhea, sore throat, trouble swallowing and voice change  Eyes: Negative for pain, discharge, redness and visual disturbance  Respiratory: Negative for cough, shortness of breath, wheezing and stridor  Cardiovascular: Negative for chest pain, palpitations, leg swelling, syncope and near-syncope  Gastrointestinal: Negative for abdominal pain, anorexia, blood in stool, constipation, diarrhea, dysphagia, hematochezia, melena, nausea and vomiting  Endocrine: Positive for cold intolerance  Negative for heat intolerance, polydipsia, polyphagia and polyuria  Genitourinary: Negative for dysuria, flank pain, frequency, hematuria and urgency  Musculoskeletal: Negative for arthralgias, falls, gait problem, joint swelling, myalgias, neck pain and neck stiffness  Skin: Negative for color change and rash  Neurological: Positive for dizziness and light-headedness  Negative for tremors, seizures, loss of consciousness, syncope, facial asymmetry, speech difficulty, weakness, numbness and headaches  Physical Exam  Physical Exam   Constitutional: She is oriented to person, place, and time  She appears well-developed and well-nourished  No distress  HENT:   Head: Normocephalic and atraumatic     Nose: Nose normal  Mouth/Throat: Oropharynx is clear and moist    Eyes: Pupils are equal, round, and reactive to light  Conjunctivae and EOM are normal    Neck: Normal range of motion  Neck supple  Cardiovascular: Normal rate, regular rhythm and intact distal pulses  Pulmonary/Chest: Effort normal and breath sounds normal    Abdominal: Soft  Bowel sounds are normal  There is no tenderness  There is no guarding  Musculoskeletal: Normal range of motion  Neurological: She is alert and oriented to person, place, and time  She has normal strength and normal reflexes  GCS eye subscore is 4  GCS verbal subscore is 5  GCS motor subscore is 6  Skin: Skin is warm and dry  Capillary refill takes less than 2 seconds  Psychiatric: She has a normal mood and affect  Nursing note and vitals reviewed  Vital Signs  ED Triage Vitals   Temperature Pulse Respirations Blood Pressure SpO2   06/27/19 1455 06/27/19 1455 06/27/19 1455 06/27/19 1455 06/27/19 1455   98 9 °F (37 2 °C) 82 18 119/66 97 %      Temp Source Heart Rate Source Patient Position - Orthostatic VS BP Location FiO2 (%)   06/27/19 1455 06/27/19 1455 06/27/19 1455 06/27/19 1455 --   Oral Monitor Sitting Left arm       Pain Score       06/27/19 1530       5           Vitals:    06/27/19 1455 06/27/19 1530   BP: 119/66 122/58   Pulse: 82 77   Patient Position - Orthostatic VS: Sitting Sitting         Visual Acuity      ED Medications  Medications   sodium chloride 0 9 % bolus 1,000 mL (1,000 mL Intravenous New Bag 6/27/19 1606)       Diagnostic Studies  Results Reviewed     Procedure Component Value Units Date/Time    TSH [[id number]]  (Normal) Collected:  06/27/19 1605    Lab Status:  Final result Specimen:  Blood from Arm, Right Updated:  06/27/19 1641     TSH 3RD GENERATON 1 475 uIU/mL     Narrative:       Patients undergoing fluorescein dye angiography may retain small amounts of fluorescein in the body for 48-72 hours post procedure   Samples containing fluorescein can produce falsely depressed TSH values  If the patient had this procedure,a specimen should be resubmitted post fluorescein clearance        POCT pregnancy, urine [[id number]]  (Normal) Resulted:  06/27/19 1637    Lab Status:  Final result Updated:  06/27/19 1637     EXT PREG TEST UR (Ref: Negative) negative     Control valid    Comprehensive metabolic panel [[id number]] Collected:  06/27/19 1605    Lab Status:  Final result Specimen:  Blood from Arm, Right Updated:  06/27/19 1631     Sodium 139 mmol/L      Potassium 3 7 mmol/L      Chloride 104 mmol/L      CO2 24 mmol/L      ANION GAP 11 mmol/L      BUN 13 mg/dL      Creatinine 0 90 mg/dL      Glucose 99 mg/dL      Calcium 9 0 mg/dL      AST 20 U/L      ALT 20 U/L      Alkaline Phosphatase 50 U/L      Total Protein 7 6 g/dL      Albumin 3 7 g/dL      Total Bilirubin 0 50 mg/dL      eGFR 92 ml/min/1 73sq m     Narrative:       National Kidney Disease Foundation guidelines for Chronic Kidney Disease (CKD):     Stage 1 with normal or high GFR (GFR > 90 mL/min/1 73 square meters)    Stage 2 Mild CKD (GFR = 60-89 mL/min/1 73 square meters)    Stage 3A Moderate CKD (GFR = 45-59 mL/min/1 73 square meters)    Stage 3B Moderate CKD (GFR = 30-44 mL/min/1 73 square meters)    Stage 4 Severe CKD (GFR = 15-29 mL/min/1 73 square meters)    Stage 5 End Stage CKD (GFR <15 mL/min/1 73 square meters)  Note: GFR calculation is accurate only with a steady state creatinine    UA w Reflex to Microscopic [[id number]]  (Abnormal) Collected:  06/27/19 1616    Lab Status:  Final result Specimen:  Urine, Clean Catch Updated:  06/27/19 1623     Color, UA Yellow     Clarity, UA Clear     Specific Gravity, UA >=1 030     pH, UA 6 0     Leukocytes, UA Negative     Nitrite, UA Negative     Protein, UA Negative mg/dl      Glucose, UA Negative mg/dl      Ketones, UA 15 (1+) mg/dl      Urobilinogen, UA 0 2 E U /dl      Bilirubin, UA Negative     Blood, UA Negative    CBC and differential [[id number]] (Abnormal) Collected:  06/27/19 1605    Lab Status:  Final result Specimen:  Blood from Arm, Right Updated:  06/27/19 1615     WBC 11 25 Thousand/uL      RBC 4 65 Million/uL      Hemoglobin 13 7 g/dL      Hematocrit 41 7 %      MCV 90 fL      MCH 29 5 pg      MCHC 32 9 g/dL      RDW 12 6 %      MPV 10 1 fL      Platelets 758 Thousands/uL      nRBC 0 /100 WBCs      Neutrophils Relative 73 %      Immat GRANS % 0 %      Lymphocytes Relative 22 %      Monocytes Relative 5 %      Eosinophils Relative 0 %      Basophils Relative 0 %      Neutrophils Absolute 8 11 Thousands/µL      Immature Grans Absolute 0 03 Thousand/uL      Lymphocytes Absolute 2 42 Thousands/µL      Monocytes Absolute 0 61 Thousand/µL      Eosinophils Absolute 0 05 Thousand/µL      Basophils Absolute 0 03 Thousands/µL     ED Urine Macroscopic [[id number]]  (Abnormal) Collected:  06/27/19 1620    Lab Status:  Final result Specimen:  Urine Updated:  06/27/19 1612     Color, UA Yellow     Clarity, UA Clear     pH, UA 6 5     Leukocytes, UA Negative     Nitrite, UA Negative     Protein, UA Negative mg/dl      Glucose, UA Negative mg/dl      Ketones, UA 15 (1+) mg/dl      Urobilinogen, UA 0 2 E U /dl      Bilirubin, UA Negative     Blood, UA Negative     Specific Gravity, UA >=1 030    Narrative:       CLINITEK RESULT                 No orders to display              Procedures  Procedures       ED Course                               MDM  Number of Diagnoses or Management Options  Anxiety: new and does not require workup  Dizziness: new and does not require workup  Fatigue: new and does not require workup  Diagnosis management comments: Differential diagnosis included but not limited to: Fatigue, anemia, hypothyroidism, electrolyte abnormality, vertigo, dizziness, dehydration, anxiety unlikely intracranial process    Patient is not interested in mediations for anxiety at this time  I did give patient information for local psychiatric care   I provided patient with strict RTER precautions  I advised patient follow-up with PCP in 24-48 hours  Patient verbalized understanding  Amount and/or Complexity of Data Reviewed  Clinical lab tests: reviewed and ordered  Review and summarize past medical records: yes    Risk of Complications, Morbidity, and/or Mortality  Presenting problems: moderate  Diagnostic procedures: moderate  Management options: moderate    Patient Progress  Patient progress: improved      Disposition  Final diagnoses:   Fatigue   Dizziness   Anxiety     Time reflects when diagnosis was documented in both MDM as applicable and the Disposition within this note     Time User Action Codes Description Comment    6/27/2019  4:42 PM Catrachita Pears Add [R53 83] Fatigue     6/27/2019  4:42 PM Catrachita Pears Add [R42] Dizziness     6/27/2019  4:43 PM Catrachita Pears Add [F41 9] Anxiety       ED Disposition     ED Disposition Condition Date/Time Comment    Discharge Stable Thu Jun 27, 2019  4:42 PM Araceli Robert discharge to home/self care              Follow-up Information     Follow up With Specialties Details Why Contact Info Additional [address] Emergency Department Emergency Medicine Go to  If symptoms worsen [address]  [phone] [facility], [address], [place], [address]    Perez Wolf MD Family Medicine Schedule an appointment as soon as possible for a visit   [address]  [address] [place] [address] Schedule an appointment as soon as possible for a visit   [address] [facility]  [phone]  [address] [place] [address], [address] [place] [address], [phone]          Patient's Medications   Discharge Prescriptions    MECLIZINE (ANTIVERT) 25 MG TABLET    Take 1 tablet (25 mg total) by mouth 3 (three) times a day as needed for dizziness       Start Date: 6/27/2019 End Date: --       Order Dose: 25 mg       Quantity: 10 tablet    Refills: 0     No discharge procedures on file      ED Provider  Electronically Signed by           Nelson Lam PA-C  06/27/19 0919

## 2021-05-27 ENCOUNTER — HOSPITAL ENCOUNTER (EMERGENCY)
Facility: HOSPITAL | Age: 21
Discharge: HOME | End: 2021-05-27
Attending: EMERGENCY MEDICINE | Admitting: EMERGENCY MEDICINE
Payer: COMMERCIAL

## 2021-05-27 VITALS
RESPIRATION RATE: 18 BRPM | HEART RATE: 75 BPM | WEIGHT: 161.4 LBS | BODY MASS INDEX: 26.89 KG/M2 | SYSTOLIC BLOOD PRESSURE: 115 MMHG | TEMPERATURE: 96.6 F | HEIGHT: 65 IN | OXYGEN SATURATION: 100 % | DIASTOLIC BLOOD PRESSURE: 76 MMHG

## 2021-05-27 DIAGNOSIS — R51.9 HEADACHE: Primary | ICD-10-CM

## 2021-05-27 DIAGNOSIS — R42 DIZZINESS: ICD-10-CM

## 2021-05-27 LAB
BILIRUB UR QL STRIP: NEGATIVE
CLARITY UR: CLEAR
COLOR UR: ABNORMAL
EXT PREG TEST URINE: NEGATIVE
EXT. CONTROL ED NAV: NORMAL
GLUCOSE UR STRIP-MCNC: NEGATIVE MG/DL
HGB UR QL STRIP.AUTO: NEGATIVE
KETONES UR STRIP-MCNC: ABNORMAL MG/DL
LEUKOCYTE ESTERASE UR QL STRIP: NEGATIVE
NITRITE UR QL STRIP: NEGATIVE
PH UR STRIP.AUTO: 7 [PH]
PROT UR STRIP-MCNC: NEGATIVE MG/DL
SP GR UR STRIP.AUTO: 1.01 (ref 1–1.04)
UROBILINOGEN UA: NEGATIVE MG/DL

## 2021-05-27 PROCEDURE — 81025 URINE PREGNANCY TEST: CPT | Performed by: EMERGENCY MEDICINE

## 2021-05-27 PROCEDURE — 99284 EMERGENCY DEPT VISIT MOD MDM: CPT

## 2021-05-27 PROCEDURE — 96372 THER/PROPH/DIAG INJ SC/IM: CPT

## 2021-05-27 PROCEDURE — 99284 EMERGENCY DEPT VISIT MOD MDM: CPT | Performed by: EMERGENCY MEDICINE

## 2021-05-27 RX ORDER — KETOROLAC TROMETHAMINE 30 MG/ML
30 INJECTION, SOLUTION INTRAMUSCULAR; INTRAVENOUS ONCE
Status: COMPLETED | OUTPATIENT
Start: 2021-05-27 | End: 2021-05-27

## 2021-05-27 RX ORDER — IBUPROFEN 600 MG/1
600 TABLET ORAL EVERY 8 HOURS PRN
Qty: 15 TABLET | Refills: 0 | Status: SHIPPED | OUTPATIENT
Start: 2021-05-27

## 2021-05-27 RX ADMIN — KETOROLAC TROMETHAMINE 30 MG: 30 INJECTION, SOLUTION INTRAMUSCULAR at 11:33

## 2021-05-27 NOTE — ED PROVIDER NOTES
History  Chief Complaint   Patient presents with    Dizziness     for 2 days    Headache     yesterday       History provided by:  Patient   used: No    Dizziness  Quality:  Lightheadedness  Severity:  Mild  Onset quality:  Gradual  Duration:  2 days  Timing:  Intermittent  Progression:  Waxing and waning  Chronicity:  New  Context: not with loss of consciousness    Relieved by:  Nothing  Worsened by:  Nothing  Ineffective treatments:  None tried  Associated symptoms: headaches    Associated symptoms: no chest pain, no diarrhea, no nausea, no palpitations, no shortness of breath, no syncope, no vision changes and no vomiting    Headaches:     Severity:  Moderate    Onset quality:  Gradual    Duration:  2 days    Timing:  Intermittent    Progression:  Waxing and waning    Chronicity:  Recurrent  Risk factors: anemia    Risk factors comment:  Hx of Migraines  Headache  Associated symptoms: dizziness    Associated symptoms: no abdominal pain, no back pain, no cough, no diarrhea, no eye pain, no fever, no nausea, no neck pain, no neck stiffness, no sore throat, no syncope, no visual change and no vomiting        Prior to Admission Medications   Prescriptions Last Dose Informant Patient Reported? Taking?   meclizine (ANTIVERT) 25 mg tablet   No No   Sig: Take 1 tablet (25 mg total) by mouth 3 (three) times a day as needed for dizziness      Facility-Administered Medications: None       Past Medical History:   Diagnosis Date    Anemia     Anxiety     Depression        Past Surgical History:   Procedure Laterality Date    ANKLE SURGERY Left        History reviewed  No pertinent family history  I have reviewed and agree with the history as documented      E-Cigarette/Vaping     E-Cigarette/Vaping Substances     Social History     Tobacco Use    Smoking status: Never Smoker    Smokeless tobacco: Never Used   Substance Use Topics    Alcohol use: No    Drug use: Yes     Types: Marijuana Review of Systems   Constitutional: Negative for chills and fever  HENT: Negative for facial swelling, sore throat and trouble swallowing  Eyes: Negative for pain and visual disturbance  Respiratory: Negative for cough and shortness of breath  Cardiovascular: Negative for chest pain, palpitations, leg swelling and syncope  Gastrointestinal: Negative for abdominal pain, diarrhea, nausea and vomiting  Genitourinary: Negative for dysuria and flank pain  Musculoskeletal: Negative for back pain, neck pain and neck stiffness  Skin: Negative for pallor and rash  Allergic/Immunologic: Negative for environmental allergies and immunocompromised state  Neurological: Positive for dizziness and headaches  Hematological: Negative for adenopathy  Does not bruise/bleed easily  Psychiatric/Behavioral: Negative for agitation and behavioral problems  All other systems reviewed and are negative  Physical Exam  Physical Exam  Vitals signs and nursing note reviewed  Constitutional:       General: She is not in acute distress  Appearance: She is well-developed  HENT:      Head: Normocephalic and atraumatic  Eyes:      Extraocular Movements: Extraocular movements intact  Pupils: Pupils are equal, round, and reactive to light  Neck:      Musculoskeletal: Normal range of motion and neck supple  Cardiovascular:      Rate and Rhythm: Normal rate and regular rhythm  Pulmonary:      Effort: Pulmonary effort is normal  No respiratory distress  Abdominal:      General: Bowel sounds are normal       Palpations: Abdomen is soft  Tenderness: There is no abdominal tenderness  There is no guarding or rebound  Musculoskeletal: Normal range of motion  Skin:     General: Skin is warm and dry  Neurological:      General: No focal deficit present  Mental Status: She is alert and oriented to person, place, and time  Cranial Nerves: No cranial nerve deficit        Motor: No weakness  Coordination: Coordination normal    Psychiatric:         Mood and Affect: Mood normal          Behavior: Behavior normal          Vital Signs  ED Triage Vitals [05/27/21 1036]   Temperature Pulse Respirations Blood Pressure SpO2   (!) 96 6 °F (35 9 °C) 75 18 115/76 100 %      Temp Source Heart Rate Source Patient Position - Orthostatic VS BP Location FiO2 (%)   Tympanic -- -- -- --      Pain Score       7           Vitals:    05/27/21 1036   BP: 115/76   Pulse: 75         Visual Acuity      ED Medications  Medications   ketorolac (TORADOL) injection 30 mg (30 mg Intramuscular Given 5/27/21 1133)       Diagnostic Studies  Results Reviewed     Procedure Component Value Units Date/Time    UA (URINE) with reflex to Scope [[id number]]  (Abnormal) Collected: 05/27/21 1058    Lab Status: Final result Specimen: Urine, Clean Catch Updated: 05/27/21 1130     Color, UA Straw     Clarity, UA Clear     Specific Gravity, UA 1 015     pH, UA 7 0     Leukocytes, UA Negative     Nitrite, UA Negative     Protein, UA Negative mg/dl      Glucose, UA Negative mg/dl      Ketones, UA 15 (1+) mg/dl      Bilirubin, UA Negative     Blood, UA Negative     UROBILINOGEN UA Negative mg/dL     POCT pregnancy, urine [[id number]]  (Normal) Resulted: 05/27/21 1059    Lab Status: Final result Updated: 05/27/21 1100     EXT PREG TEST UR (Ref: Negative) negative     Control valid                 No orders to display              Procedures  Procedures         ED Course  ED Course as of May 27 1134   Thu May 27, 2021   1107 Urine preg negative  PREGNANCY TEST URINE: negative                             SBIRT 22yo+      Most Recent Value   SBIRT (22 yo +)   In order to provide better care to our patients, we are screening all of our patients for alcohol and drug use  Would it be okay to ask you these screening questions? Yes Filed at: 05/27/2021 1058   Initial Alcohol Screen: US AUDIT-C    1   How often do you have a drink containing alcohol?  0 Filed at: 05/27/2021 1058   2  How many drinks containing alcohol do you have on a typical day you are drinking? 0 Filed at: 05/27/2021 1058   3a  Male UNDER 65: How often do you have five or more drinks on one occasion? 0 Filed at: 05/27/2021 1058   3b  FEMALE Any Age, or MALE 65+: How often do you have 4 or more drinks on one occassion? 0 Filed at: 05/27/2021 1058   Audit-C Score  0 Filed at: 05/27/2021 1058   ASHANTI: How many times in the past year have you    Used an illegal drug or used a prescription medication for non-medical reasons? Never Filed at: 05/27/2021 1058                    MDM  Number of Diagnoses or Management Options  Diagnosis management comments: Patient is a 23-year-old female, history of anemia, migraines, comes in with complaints of dizziness, headache, states that she has been having headaches for the past 2 days, did not take any medications as she does not like to take pills, patient states that she recently had blood work that did not show anemia and she thinks her dizziness and headaches could be related and feels that she may need further evaluation by specialist like a neurologist; denies fever, visual disturbance, neck pain, chest pain, dyspnea, edema  On exam, patient is conscious, alert, vital signs are stable, well-appearing, nontoxic, pupils equal round reactive to light, no focal neuro deficits, no nystagmus; conjunctivae pink  Impression:  Recurrent headache, migraine, nonspecific dizziness, doubt anemia as patient is stable hemodynamically, pink conjunctiva, discussed with patient about checking CBC due to her history of anemia, however patient feels that he does not think that her anemia would be worse, her last hemoglobin was above 11, I also feel in light of pt's stable, well-appearing presentation, pink conjunctiva, doubt there would be significant anemia    Patient however is agreeable for injectable pain medication for her headache/migraine, patient also is requesting a work noted she missed work today  Discussed with patient, we will give Neurology referral for her recurrent headaches/migraines, outpatient MRI testing to be discussed with her PCP/neurology  Patient shows understanding instructions and is okay with the plan  Amount and/or Complexity of Data Reviewed  Clinical lab tests: ordered and reviewed        Disposition  Final diagnoses:   Headache   Dizziness     Time reflects when diagnosis was documented in both MDM as applicable and the Disposition within this note     Time User Action Codes Description Comment    5/27/2021 11:07 AM Arpita Drummond Add [R51 9] Headache     5/27/2021 11:07 AM Arpita Drummond Add [R42] Dizziness       ED Disposition     ED Disposition Condition Date/Time Comment    Discharge Stable Thu May 27, 2021 11:08 AM Soha Avilez discharge to home/self care              Follow-up Information     Follow up With Specialties Details Why Contact Info Additional Information    Crystal Hassan MD Family Medicine Schedule an appointment as soon as possible for a visit   [address] [facility]  [address] [facility] [address] [facility] Neurology [facility] Neurology Schedule an appointment as soon as possible for a visit   [address] [facility] [phone]  [address] [place] [address], [address] [facility], [id number] [place] [address], [address]    [facility] Emergency Department Emergency Medicine  If symptoms worsen [address] [phone]  [facility] Heart Emergency Department          Discharge Medication List as of 5/27/2021 11:10 AM      START taking these medications    Details   ibuprofen (MOTRIN) 600 mg tablet Take 1 tablet (600 mg total) by mouth every 8 (eight) hours as needed for mild pain or moderate pain, Starting Thu 5/27/2021, Print CONTINUE these medications which have NOT CHANGED    Details   meclizine (ANTIVERT) 25 mg tablet Take 1 tablet (25 mg total) by mouth 3 (three) times a day as needed for dizziness, Starting Thu 6/27/2019, Print           No discharge procedures on file      PDMP Review     None          ED Provider  Electronically Signed by           Jackie Temple MD  05/27/21 8623

## 2021-05-27 NOTE — Clinical Note
Lawrence Beltran was seen and treated in our emergency department on 5/27/2021  Diagnosis:     Kim Marmolejo  may return to work on return date  She may return on this date: 05/28/2021         If you have any questions or concerns, please don't hesitate to call        Mare Pierson MD    ______________________________           _______________          _______________  Hospital Representative                              Date                                Time

## 2023-08-08 ENCOUNTER — HOSPITAL ENCOUNTER (EMERGENCY)
Facility: HOSPITAL | Age: 23
Discharge: HOME | End: 2023-08-08
Attending: EMERGENCY MEDICINE
Payer: COMMERCIAL

## 2023-08-08 VITALS
RESPIRATION RATE: 16 BRPM | WEIGHT: 160.05 LBS | OXYGEN SATURATION: 100 % | HEART RATE: 59 BPM | TEMPERATURE: 98 F | BODY MASS INDEX: 26.63 KG/M2 | DIASTOLIC BLOOD PRESSURE: 59 MMHG | SYSTOLIC BLOOD PRESSURE: 113 MMHG

## 2023-08-08 DIAGNOSIS — T19.2XXA FOREIGN BODY IN VAGINA, INITIAL ENCOUNTER: Primary | ICD-10-CM

## 2023-08-08 PROCEDURE — 99283 EMERGENCY DEPT VISIT LOW MDM: CPT | Performed by: EMERGENCY MEDICINE

## 2023-08-08 PROCEDURE — 99282 EMERGENCY DEPT VISIT SF MDM: CPT

## 2023-08-08 PROCEDURE — NC001 PR NO CHARGE: Performed by: PHYSICIAN ASSISTANT

## 2023-08-08 NOTE — ED PROVIDER NOTES
History  Chief Complaint   Patient presents with   • Foreign Body in Vagina     Patient placed a tampon in vagina earlier and now she can not find it. This is a 29-year-old female presenting to the ED for evaluation of foreign body in the vagina. Patient states that she placed a tampon in the vagina approximately 21 hours ago and forgot about it. Patient states she woke up this evening and attempted to remove tampon from her vagina but was unsuccessful. She does have an IUD in place. She denies any vaginal bleeding or discharge currently. She denies any pelvic pain. History provided by:  Patient   used: No        Prior to Admission Medications   Prescriptions Last Dose Informant Patient Reported? Taking?   ibuprofen (MOTRIN) 600 mg tablet   No No   Sig: Take 1 tablet (600 mg total) by mouth every 8 (eight) hours as needed for mild pain or moderate pain   meclizine (ANTIVERT) 25 mg tablet   No No   Sig: Take 1 tablet (25 mg total) by mouth 3 (three) times a day as needed for dizziness      Facility-Administered Medications: None       Past Medical History:   Diagnosis Date   • Anemia    • Anxiety    • Depression        Past Surgical History:   Procedure Laterality Date   • ANKLE SURGERY Left        No family history on file. I have reviewed and agree with the history as documented. E-Cigarette/Vaping     E-Cigarette/Vaping Substances     Social History     Tobacco Use   • Smoking status: Never   • Smokeless tobacco: Never   Substance Use Topics   • Alcohol use: No   • Drug use: Yes     Types: Marijuana       Review of Systems   Genitourinary:        Tampon in vagina   All other systems reviewed and are negative. Physical Exam  Physical Exam  Vitals reviewed. Exam conducted with a chaperone present (JEREMY Nunez). Constitutional:       General: She is not in acute distress. Appearance: Normal appearance. She is well-developed and well-groomed. She is not ill-appearing. HENT:      Head: Normocephalic and atraumatic. Right Ear: External ear normal.      Left Ear: External ear normal.      Nose: Nose normal.   Eyes:      General: No scleral icterus. Conjunctiva/sclera: Conjunctivae normal.   Cardiovascular:      Rate and Rhythm: Normal rate and regular rhythm. Pulmonary:      Effort: Pulmonary effort is normal.      Breath sounds: No stridor. Abdominal:      General: There is no distension. Genitourinary:     Vagina: Foreign body (tampon) present. No vaginal discharge or bleeding. Comments: Tampon noted to the R of the cervix, strings not visible. IUD strings noted. Musculoskeletal:         General: No deformity. Normal range of motion. Cervical back: Normal range of motion. Skin:     Coloration: Skin is not jaundiced or pale. Findings: No lesion or rash. Neurological:      Mental Status: She is alert and oriented to person, place, and time. Psychiatric:         Mood and Affect: Mood normal.         Behavior: Behavior normal. Behavior is cooperative. Vital Signs  ED Triage Vitals [08/08/23 0449]   Temperature Pulse Respirations Blood Pressure SpO2   98 °F (36.7 °C) 59 16 113/59 100 %      Temp src Heart Rate Source Patient Position - Orthostatic VS BP Location FiO2 (%)   -- Monitor Sitting Right arm --      Pain Score       --           Vitals:    08/08/23 0449   BP: 113/59   Pulse: 59   Patient Position - Orthostatic VS: Sitting         Visual Acuity      ED Medications  Medications - No data to display    Diagnostic Studies  Results Reviewed     None                 No orders to display              Procedures  Procedures         ED Course                 SBIRT 20yo+    Flowsheet Row Most Recent Value   Initial Alcohol Screen: US AUDIT-C     1. How often do you have a drink containing alcohol? 0 Filed at: 08/08/2023 4806   2. How many drinks containing alcohol do you have on a typical day you are drinking?   0 Filed at: 08/08/2023 9093 3b. FEMALE Any Age, or MALE 65+: How often do you have 4 or more drinks on one occassion? 0 Filed at: 08/08/2023 0455   Audit-C Score 0 Filed at: 08/08/2023 7599   ASHANTI: How many times in the past year have you. .. Used an illegal drug or used a prescription medication for non-medical reasons? Never Filed at: 08/08/2023 0455                    Medical Decision Making      Patient presented to the ED for evaluation of tampon in the vagina. Patient states that she was unable to remove the tampon this evening. States the tampon has been in place for approximately 21 hours. She does have an IUD in place. She denies any vaginal discharge or bleeding at this time. Tampon was able to be visualized and easily removed by myself via speculum exam.  Patient tolerated without any difficulty. Return precautions discussed with patient bedside, recommend follow-up with gynecology. Prior to discharge, discharge instructions were discussed with patient at bedside. Patient was provided both verbal and written instructions. Patient is understanding of the discharge instructions and is agreeable to plan of care. Return precautions were discussed with patient bedside, patient verbalized understanding of signs and symptoms that would necessitate return to the ED. All questions were answered. Patient was comfortable with the plan of care and discharged to home. Dispo: discharge home with follow up to GYN. Patient stable, in no acute distress and non-toxic at discharge. Foreign body in vagina, initial encounter: acute illness or injury      Disposition  Final diagnoses:   Foreign body in vagina, initial encounter     Time reflects when diagnosis was documented in both MDM as applicable and the Disposition within this note     Time User Action Codes Description Comment    8/8/2023  5:01 AM Maurice Diego. 2XXA] Foreign body in vagina, initial encounter       ED Disposition     ED Disposition   Discharge Condition   Stable    Date/Time   Tue Aug 8, 2023  5:01 AM    Comment   [place] discharge to home/self care. Follow-up Information     Follow up With Specialties Details Why Contact Info    Hoda Fajardo MD Family Medicine Schedule an appointment as soon as possible for a visit  As needed [facility] [address] [place] [address]  [phone]            Discharge Medication List as of 8/8/2023  5:02 AM      CONTINUE these medications which have NOT CHANGED    Details   ibuprofen (MOTRIN) 600 mg tablet Take 1 tablet (600 mg total) by mouth every 8 (eight) hours as needed for mild pain or moderate pain, Starting Thu 5/27/2021, Print      meclizine (ANTIVERT) 25 mg tablet Take 1 tablet (25 mg total) by mouth 3 (three) times a day as needed for dizziness, Starting Thu 6/27/2019, Print             No discharge procedures on file.     PDMP Review     None          ED Provider  Electronically Signed by [facility]  08/08/23 4433

## 2023-10-17 ENCOUNTER — OFFICE VISIT (OUTPATIENT)
Dept: URGENT CARE | Age: 23
End: 2023-10-17
Payer: COMMERCIAL

## 2023-10-17 VITALS
DIASTOLIC BLOOD PRESSURE: 74 MMHG | TEMPERATURE: 98.3 F | OXYGEN SATURATION: 99 % | HEART RATE: 102 BPM | RESPIRATION RATE: 18 BRPM | SYSTOLIC BLOOD PRESSURE: 114 MMHG

## 2023-10-17 DIAGNOSIS — R11.0 NAUSEA: ICD-10-CM

## 2023-10-17 DIAGNOSIS — U07.1 COVID-19: ICD-10-CM

## 2023-10-17 DIAGNOSIS — R05.1 ACUTE COUGH: Primary | ICD-10-CM

## 2023-10-17 PROCEDURE — 99213 OFFICE O/P EST LOW 20 MIN: CPT

## 2023-10-17 RX ORDER — BENZONATATE 100 MG/1
100 CAPSULE ORAL 3 TIMES DAILY PRN
Qty: 20 CAPSULE | Refills: 0 | Status: SHIPPED | OUTPATIENT
Start: 2023-10-17

## 2023-10-17 RX ORDER — ONDANSETRON 4 MG/1
4 TABLET, FILM COATED ORAL EVERY 8 HOURS PRN
Qty: 20 TABLET | Refills: 0 | Status: SHIPPED | OUTPATIENT
Start: 2023-10-17

## 2023-10-17 NOTE — PATIENT INSTRUCTIONS
Use benzonatate as directed as needed for cough. Use Zofran as prescribed. Hydration and rest.  Acetaminophen and ibuprofen for pain relief and fever reduction. PCP follow up in 3-5 days. Go to an emergency department if difficulty breathing occurs or if symptoms worsen.     Recommended supplements forOVID-19 is the following: Vitamin D3 2000 IU  daily ,  Vitamin C 1g  every 12 hours , Multivitamin Daily

## 2023-10-17 NOTE — LETTER
[facility] Dhruv Barry [address] [place] [address] Drive  [address] [address]  Dept: [phone]    October 17, 2023    Patient: Abilio Jaimes  YOB: 2000    Abilio Jaimes was seen and evaluated at our [facility]. they may return to work on 10/21/23, as this is 5 days from the onset of symptoms. Upon return, they must then adhere to strict masking for an additional 5 days.     Sincerely,    Jacqui Hair

## 2023-10-17 NOTE — PROGRESS NOTES
[facility] Now        NAME: Marci Guzman is a 20 y.o. female  : 2000    MRN: [id number]  DATE: 2023  TIME: 2:21 PM      Assessment and Plan     Acute cough [R05.1]  1. Acute cough  benzonatate (TESSALON PERLES) 100 mg capsule      2. COVID-19        3. Nausea  ondansetron (ZOFRAN) 4 mg tablet          Rapid covid positive- patient aware. Patient Instructions   Use benzonatate as directed as needed for cough. Use Zofran as prescribed. Hydration and rest.  Acetaminophen and ibuprofen for pain relief and fever reduction. PCP follow up in 3-5 days. Go to an emergency department if difficulty breathing occurs or if symptoms worsen. Recommended supplements forOVID-19 is the following: Vitamin D3 2000 IU  daily ,  Vitamin C 1g  every 12 hours , Multivitamin Daily       Chief Complaint     Chief Complaint   Patient presents with    Fever     Patient relates fever, vomiting last PM, chills. Slight cough starting last PM, symptoms increased last PM         History of Present Illness     Patient is a 68-year-old female who presents with cough, fever, nausea, and vomiting that started one day ago. Denies known sick contacts. Review of Systems     Review of Systems   Constitutional:  Positive for fever. Respiratory:  Positive for cough. Gastrointestinal:  Positive for nausea and vomiting. All other systems reviewed and are negative.         Current Medications       Current Outpatient Medications:     benzonatate (TESSALON PERLES) 100 mg capsule, Take 1 capsule (100 mg total) by mouth 3 (three) times a day as needed for cough, Disp: 20 capsule, Rfl: 0    Levonorgestrel (MIRENA) 20 MCG/DAY IUD, 1 each by Intrauterine route, Disp: , Rfl:     ondansetron (ZOFRAN) 4 mg tablet, Take 1 tablet (4 mg total) by mouth every 8 (eight) hours as needed for nausea or vomiting, Disp: 20 tablet, Rfl: 0    ibuprofen (MOTRIN) 600 mg tablet, Take 1 tablet (600 mg total) by mouth every 8 (eight) hours as needed for mild pain or moderate pain (Patient not taking: Reported on 10/17/2023), Disp: 15 tablet, Rfl: 0    meclizine (ANTIVERT) 25 mg tablet, Take 1 tablet (25 mg total) by mouth 3 (three) times a day as needed for dizziness (Patient not taking: Reported on 10/17/2023), Disp: 10 tablet, Rfl: 0    Current Allergies     Allergies as of 10/17/2023    (No Known Allergies)              The following portions of the patient's history were reviewed and updated as appropriate: allergies, current medications, past family history, past medical history, past social history, past surgical history and problem list.     Past Medical History:   Diagnosis Date    Anemia     Anxiety     Depression        Past Surgical History:   Procedure Laterality Date    ANKLE SURGERY Left        No family history on file. Medications have been verified. Objective     /74   Pulse 102   Temp 98.3 °F (36.8 °C)   Resp 18   SpO2 99%   No LMP recorded. Physical Exam     Physical Exam  Vitals and nursing note reviewed. Constitutional:       General: She is awake. She is not in acute distress. Appearance: Normal appearance. She is not ill-appearing, toxic-appearing or diaphoretic. HENT:      Right Ear: Tympanic membrane, ear canal and external ear normal.      Left Ear: Tympanic membrane, ear canal and external ear normal.      Nose: Rhinorrhea present. Mouth/Throat:      Lips: Pink. Mouth: Mucous membranes are moist.      Pharynx: Oropharynx is clear. Uvula midline. No oropharyngeal exudate or posterior oropharyngeal erythema. Cardiovascular:      Rate and Rhythm: Normal rate. Pulses: Normal pulses. Heart sounds: Normal heart sounds, S1 normal and S2 normal.   Pulmonary:      Effort: Pulmonary effort is normal.      Breath sounds: Normal breath sounds and air entry. Skin:     General: Skin is warm. Capillary Refill: Capillary refill takes less than 2 seconds. Neurological:      Mental Status: She is alert. Psychiatric:         Mood and Affect: Mood normal.         Behavior: Behavior normal.         Thought Content:  Thought content normal.         Judgment: Judgment normal.

## 2024-05-31 ENCOUNTER — OFFICE VISIT (OUTPATIENT)
Dept: OBGYN CLINIC | Facility: CLINIC | Age: 24
End: 2024-05-31
Payer: COMMERCIAL

## 2024-05-31 VITALS
DIASTOLIC BLOOD PRESSURE: 80 MMHG | HEIGHT: 65 IN | WEIGHT: 154.6 LBS | BODY MASS INDEX: 25.76 KG/M2 | SYSTOLIC BLOOD PRESSURE: 122 MMHG

## 2024-05-31 DIAGNOSIS — Z30.49 ENCOUNTER FOR SURVEILLANCE OF OTHER CONTRACEPTIVE: Primary | ICD-10-CM

## 2024-05-31 PROBLEM — Z97.5 CONTRACEPTION, DEVICE INTRAUTERINE: Status: RESOLVED | Noted: 2018-12-21 | Resolved: 2024-05-31

## 2024-05-31 PROBLEM — Z30.9 CONTRACEPTIVE MANAGEMENT: Status: RESOLVED | Noted: 2018-12-07 | Resolved: 2024-05-31

## 2024-05-31 PROCEDURE — 99203 OFFICE O/P NEW LOW 30 MIN: CPT | Performed by: PHYSICIAN ASSISTANT

## 2024-05-31 NOTE — PROGRESS NOTES
"Assessment/Plan:      Diagnoses and all orders for this visit:    Encounter for surveillance of other contraceptive        Discussed IUD with patient.  Mirena would not  until .  Fertility returns once IUD is removed.  Would not recommend removing until she is ready to conceive.  Currently doing well with IUD in place.  Patient elects to keep Mirena IUD.  Will f/u in the next few weeks for yearly gyn exam.  Call with problems in the interim.    Subjective:     Patient ID: Mamie Schultz is a 23 y.o. female.    Patient is here to discuss contraception.  She is new to our office today.  Has not been to the gyn since .  Patient had Mirena IUD placed 2018. Gets an occasional period that lasts for 7-8 days.  Flow is just spotting/light.  Sometimes has mild cramping.  Denies bowel/bladder changes, pelvic pain, bloating, abdominal pain, n/v, change in appetite, HA, and mood symptoms.  States she was on the pill in high school, but did not remember to take it on time. May want to conceive in the next 1-2 years.  Has not had a Pap.        Review of Systems   Constitutional:  Negative for appetite change and unexpected weight change.   Gastrointestinal:  Negative for abdominal distention, abdominal pain, constipation, diarrhea, nausea and vomiting.   Genitourinary:  Negative for difficulty urinating, dysuria, frequency, genital sores, hematuria, menstrual problem, pelvic pain, urgency, vaginal bleeding, vaginal discharge and vaginal pain.   Neurological:  Negative for headaches.         Objective:  Visit Vitals  /80 (BP Location: Left arm, Patient Position: Sitting, Cuff Size: Adult)   Ht 5' 5\" (1.651 m)   Wt 70.1 kg (154 lb 9.6 oz)   LMP 2024 (Approximate)   BMI 25.73 kg/m²   OB Status Unknown   Smoking Status Never   BSA 1.77 m²         Physical Exam  Vitals reviewed.   Constitutional:       Appearance: Normal appearance. She is well-developed and normal weight.   Neurological:      " Mental Status: She is alert and oriented to person, place, and time.   Psychiatric:         Mood and Affect: Mood normal.         Behavior: Behavior normal. Behavior is cooperative.         Thought Content: Thought content normal.         Judgment: Judgment normal.

## 2024-09-07 NOTE — LETTER
August 6, 2018     Patient: [facility]   YOB: 2000   Date of Visit: 8/6/2018       To Whom it May Concern:    Avis Stokes is under my professional care  She was seen in my office on 8/6/2018 for evaluation of left ankle clearance due to prior surgery in 2013  She has no functional limits on exam today and can be cleared for full activity with regards to the left ankle  If you have any questions or concerns, please don't hesitate to call           Sincerely,          Nikky Reyes MD        CC: [facility]
Opt out